# Patient Record
Sex: FEMALE | Race: WHITE | NOT HISPANIC OR LATINO | Employment: FULL TIME | URBAN - NONMETROPOLITAN AREA
[De-identification: names, ages, dates, MRNs, and addresses within clinical notes are randomized per-mention and may not be internally consistent; named-entity substitution may affect disease eponyms.]

---

## 2017-01-03 ENCOUNTER — OFFICE VISIT (OUTPATIENT)
Dept: FAMILY MEDICINE CLINIC | Facility: CLINIC | Age: 52
End: 2017-01-03

## 2017-01-03 VITALS
WEIGHT: 149.9 LBS | BODY MASS INDEX: 25.59 KG/M2 | OXYGEN SATURATION: 98 % | HEIGHT: 64 IN | DIASTOLIC BLOOD PRESSURE: 64 MMHG | SYSTOLIC BLOOD PRESSURE: 148 MMHG | HEART RATE: 92 BPM

## 2017-01-03 DIAGNOSIS — Z11.59 NEED FOR HEPATITIS C SCREENING TEST: ICD-10-CM

## 2017-01-03 DIAGNOSIS — I10 ESSENTIAL HYPERTENSION: ICD-10-CM

## 2017-01-03 DIAGNOSIS — Z12.11 SCREENING FOR COLON CANCER: Primary | ICD-10-CM

## 2017-01-03 DIAGNOSIS — Z12.39 SCREENING FOR BREAST CANCER: ICD-10-CM

## 2017-01-03 PROCEDURE — 99213 OFFICE O/P EST LOW 20 MIN: CPT | Performed by: FAMILY MEDICINE

## 2017-01-03 RX ORDER — LOSARTAN POTASSIUM 50 MG/1
50 TABLET ORAL DAILY
Qty: 30 TABLET | Refills: 6 | Status: SHIPPED | OUTPATIENT
Start: 2017-01-03 | End: 2017-03-06 | Stop reason: SDUPTHER

## 2017-01-03 NOTE — MR AVS SNAPSHOT
Maria Herrera   1/3/2017 3:30 PM   Office Visit    Dept Phone:  [phone]   Encounter #:  [id number]    Provider:  Savage Tam MD   Department:  [facility]                Your Full Care Plan              Today's Medication Changes          These changes are accurate as of: 1/3/17  5:34 PM.  If you have any questions, ask your nurse or doctor.               Medication(s)that have changed:     losartan 50 MG tablet   Commonly known as:  COZAAR   Take 1 tablet by mouth Daily.   What changed:  See the new instructions.   Changed by:  Savage Tam MD         Stop taking medication(s)listed here:     ciprofloxacin-ciproflox HCl  MG 24 hr tablet   Commonly known as:  CIPRO XR   Stopped by:  Savage Tam MD                Where to Get Your Medications      These medications were sent to [facility]/pharmacy #[id number] - [place], [place] - [address] [place] [address] - [phone] [facility] [phone] [address] [address]     Phone:  [phone]     losartan 50 MG tablet                  Your Updated Medication List          This list is accurate as of: 1/3/17  5:34 PM.  Always use your most recent med list.                losartan 50 MG tablet   Commonly known as:  COZAAR   Take 1 tablet by mouth Daily.               We Performed the Following     Ambulatory Referral to Gastroenterology       You Were Diagnosed With        Codes Comments    Screening for colon cancer    -  Primary ICD-10-CM: Z12.11  ICD-9-CM: V76.51     Screening for breast cancer     ICD-10-CM: Z12.39  ICD-9-CM: V76.10     Essential hypertension     ICD-10-CM: I10  ICD-9-CM: 401.9     Need for hepatitis C screening test     ICD-10-CM: Z11.59  ICD-9-CM: V73.89       Instructions     None    Patient Instructions History      Upcoming Appointments     Visit Type Date Time Department    OFFICE VISIT 1/3/2017  3:30 PM [facility] RESIDENT [facility] Signup     Our records indicate that you  "have declined [facility] signup. If you would like to sign up for [facility], please email [email or web address] or call [phone] to obtain an activation code.             Other Info from Your Visit           Allergies     Sulfa Antibiotics        Reason for Visit     Hypertension           Vital Signs     Blood Pressure Pulse Height Weight Oxygen Saturation Body Mass Index    148/64 (BP Location: Left arm, Patient Position: Sitting, Cuff Size: Adult) 92 64\" (162.6 cm) 149 lb 14.4 oz (68 kg) 98% 25.73 kg/m2    Smoking Status                   Never Smoker           Problems and Diagnoses Noted     High blood pressure    Screen for colon cancer    -  Primary    Breast cancer screening        Need for hepatitis C screening test            "

## 2017-01-03 NOTE — PROGRESS NOTES
Subjective   Maria Herrera is a 51 y.o. female. Presented today for her medication refill. She has HTN and has been doing well.     Hypertension   This is a chronic problem. The current episode started more than 1 year ago. The problem has been waxing and waning since onset. The problem is uncontrolled. Pertinent negatives include no anxiety, blurred vision, chest pain, headaches, malaise/fatigue, neck pain, orthopnea, palpitations, peripheral edema, PND, shortness of breath or sweats. There are no associated agents to hypertension. There are no known risk factors for coronary artery disease. Past treatments include angiotensin blockers. The current treatment provides moderate improvement. There is no history of angina, kidney disease, CAD/MI, CVA, heart failure, left ventricular hypertrophy, PVD or retinopathy.        The following portions of the patient's history were reviewed and updated as appropriate:   She  has a past medical history of Acute urinary tract infection; Encounter for screening for malignant neoplasm of colon; Encounter for screening mammogram for malignant neoplasm of breast; and Essential hypertension.  She  does not have any pertinent problems on file.  She  has a past surgical history that includes Pap Smear (05/16/2015).  Her family history includes Alzheimer's disease in her mother; Coronary artery disease in her brother, father, maternal grandmother, and paternal grandmother; Hyperlipidemia in her brother and sister; Hypertension in her brother, father, maternal grandmother, mother, paternal grandmother, and sister; Stroke in her mother.  She  reports that she has never smoked. She does not have any smokeless tobacco history on file. Her alcohol and drug histories are not on file.  Current Outpatient Prescriptions   Medication Sig Dispense Refill   • losartan (COZAAR) 50 MG tablet Take 1 tablet by mouth Daily. 30 tablet 6     No current facility-administered medications for this visit.       Current Outpatient Prescriptions on File Prior to Visit   Medication Sig   • [DISCONTINUED] losartan (COZAAR) 50 MG tablet TAKE 1 TABLET BY MOUTH EVERY DAY   • [DISCONTINUED] ciprofloxacin-ciproflox HCl XR (CIPRO XR) 500 MG 24 hr tablet Take 500 mg by mouth Daily.     No current facility-administered medications on file prior to visit.      She is allergic to sulfa antibiotics..    Review of Systems   Constitutional: Negative.  Negative for fatigue, fever and malaise/fatigue.   HENT: Negative.  Negative for ear pain and sore throat.    Eyes: Negative.  Negative for blurred vision, pain and visual disturbance.   Respiratory: Negative.  Negative for cough and shortness of breath.    Cardiovascular: Negative.  Negative for chest pain, palpitations, orthopnea and PND.   Gastrointestinal: Negative for abdominal pain and nausea.   Endocrine: Negative.    Genitourinary: Negative for dysuria.   Musculoskeletal: Negative for arthralgias and neck pain.   Skin: Negative for rash.   Allergic/Immunologic: Negative.    Neurological: Negative for dizziness, weakness and headaches.   Psychiatric/Behavioral: Negative for sleep disturbance.       Objective    Vitals:    01/03/17 1543   BP: 148/64   Pulse: 92   SpO2: 98%       Physical Exam   Constitutional: She is oriented to person, place, and time. She appears well-developed.   HENT:   Head: Normocephalic.   Right Ear: External ear normal.   Left Ear: External ear normal.   Nose: Nose normal.   Mouth/Throat: Oropharynx is clear and moist.   Eyes: Conjunctivae and EOM are normal. Pupils are equal, round, and reactive to light. Right eye exhibits no discharge. Left eye exhibits no discharge.   Neck: Normal range of motion. No JVD present. No tracheal deviation present. No thyromegaly present.   Cardiovascular: Normal rate, regular rhythm and intact distal pulses.  Exam reveals no gallop and no friction rub.    No murmur heard.  Pulmonary/Chest: Effort normal and breath sounds  normal. No stridor. No respiratory distress. She has no wheezes. She has no rales. She exhibits no tenderness.   Abdominal: Soft. Bowel sounds are normal. She exhibits no distension and no mass. There is no tenderness. No hernia.   Musculoskeletal: Normal range of motion. She exhibits no edema or tenderness.   Neurological: She is alert and oriented to person, place, and time. She has normal reflexes. She displays normal reflexes. No cranial nerve deficit. She exhibits normal muscle tone. Coordination normal.   Psychiatric: She has a normal mood and affect. Her behavior is normal. Judgment and thought content normal.       Assessment/Plan   Maria was seen today for hypertension.    Diagnoses and all orders for this visit:    Screening for colon cancer  -     Ambulatory Referral to Gastroenterology    Screening for breast cancer  -     Mammo Diagnostic Bilateral With CAD; Future    Essential hypertension    Need for hepatitis C screening test  -     Hepatitis Panel, Acute; Future    Other orders  -     losartan (COZAAR) 50 MG tablet; Take 1 tablet by mouth Daily.        Pt had her labs done by her work to check her cholesterol.   She has no other complain. Discuss with pt regarding elevated BP, pt she states that she has been feeling sick. Will continue to check her BP at home. If continue to run will change medication accordingly.   Will order mammogram and colonoscopy. Pt had pap smear in April 2015.           This document has been electronically signed by Savage Tam MD on January 3, 2017 4:21 PM

## 2017-01-06 NOTE — PROGRESS NOTES
I have reviewed the notes, assessments, and/or procedures performed by Savage Tam MD , I concur with her/his documentation of Maria Herrera.        This document has been electronically signed by Tali Wooten MD on January 6, 2017 7:58 AM

## 2017-02-07 ENCOUNTER — OFFICE VISIT (OUTPATIENT)
Dept: FAMILY MEDICINE CLINIC | Facility: CLINIC | Age: 52
End: 2017-02-07

## 2017-02-07 VITALS
HEIGHT: 64 IN | DIASTOLIC BLOOD PRESSURE: 76 MMHG | WEIGHT: 146.6 LBS | HEART RATE: 111 BPM | BODY MASS INDEX: 25.03 KG/M2 | SYSTOLIC BLOOD PRESSURE: 132 MMHG | OXYGEN SATURATION: 98 %

## 2017-02-07 DIAGNOSIS — D17.79 LIPOMA OF OTHER SPECIFIED SITES: Primary | ICD-10-CM

## 2017-02-07 PROCEDURE — 99212 OFFICE O/P EST SF 10 MIN: CPT | Performed by: FAMILY MEDICINE

## 2017-02-07 NOTE — PROGRESS NOTES
Subjective   Maria Herrera is a 51 y.o. female.     Presented today for a mass on back of her right shoulder, it has been there for past 5 months, she wanted to get it checked.   Pt has no other complain today. She is doing well on current medications.     History of Present Illness     The following portions of the patient's history were reviewed and updated as appropriate:   She  has a past medical history of Acute urinary tract infection; Encounter for screening for malignant neoplasm of colon; Encounter for screening mammogram for malignant neoplasm of breast; and Essential hypertension.  She  does not have any pertinent problems on file.  She  has a past surgical history that includes Pap Smear (05/16/2015).  Her family history includes Alzheimer's disease in her mother; Coronary artery disease in her brother, father, maternal grandmother, and paternal grandmother; Hyperlipidemia in her brother and sister; Hypertension in her brother, father, maternal grandmother, mother, paternal grandmother, and sister; Stroke in her mother.  She  reports that she has never smoked. She does not have any smokeless tobacco history on file. Her alcohol and drug histories are not on file.  Current Outpatient Prescriptions   Medication Sig Dispense Refill   • losartan (COZAAR) 50 MG tablet Take 1 tablet by mouth Daily. 30 tablet 6     No current facility-administered medications for this visit.      Current Outpatient Prescriptions on File Prior to Visit   Medication Sig   • losartan (COZAAR) 50 MG tablet Take 1 tablet by mouth Daily.     No current facility-administered medications on file prior to visit.      She is allergic to sulfa antibiotics..    Review of Systems   Constitutional: Negative for activity change, appetite change, fatigue and fever.   HENT: Negative for ear pain and sore throat.    Eyes: Negative for pain and visual disturbance.   Respiratory: Negative for cough and shortness of breath.    Cardiovascular:  Negative for chest pain and palpitations.   Gastrointestinal: Negative for abdominal pain and nausea.   Endocrine: Negative for cold intolerance and heat intolerance.   Genitourinary: Negative for difficulty urinating and dysuria.   Musculoskeletal: Negative for arthralgias and gait problem.   Skin: Negative for color change and rash.   Neurological: Negative for dizziness, weakness and headaches.   Hematological: Negative for adenopathy. Does not bruise/bleed easily.   Psychiatric/Behavioral: Negative for agitation, confusion and sleep disturbance.       Objective    Vitals:    02/07/17 1331   BP: 132/76   Pulse: 111   SpO2: 98%       Physical Exam   Constitutional: She is oriented to person, place, and time. She appears well-developed.   HENT:   Head: Normocephalic.   Right Ear: External ear normal.   Left Ear: External ear normal.   Nose: Nose normal.   Eyes: Conjunctivae and EOM are normal. Pupils are equal, round, and reactive to light. Right eye exhibits no discharge. Left eye exhibits no discharge.   Neck: Normal range of motion.   Cardiovascular: Normal rate, regular rhythm and intact distal pulses.  Exam reveals no gallop and no friction rub.    No murmur heard.  Pulmonary/Chest: Effort normal and breath sounds normal. No respiratory distress. She has no wheezes. She has no rales. She exhibits no tenderness.   Abdominal: Soft. Bowel sounds are normal. She exhibits no distension and no mass. There is no tenderness. No hernia.   Musculoskeletal: Normal range of motion. She exhibits no edema or tenderness.   Neurological: She is alert and oriented to person, place, and time. No cranial nerve deficit. Coordination normal.   Skin:        1x1cm in size node.   Palpable, non-fixed, soft to touch.    Psychiatric: She has a normal mood and affect. Her behavior is normal. Judgment and thought content normal.     Preventive:   She is schedule for colonoscopy in March.   She is up to date on immunization.   Pt is  getting her hepatitis C screening done.     Assessment/Plan   Diagnoses and all orders for this visit:    Lipoma of other specified sites    Lipma is 1x1 cm in size. It is not bothering her, she just wanted to get it checked. Discussed with pt if increase in size will need further follow up. Continue to monitor for any sign of symptoms.           This document has been electronically signed by Savage Tam MD on February 7, 2017 1:53 PM

## 2017-02-07 NOTE — PROGRESS NOTES
I have reviewed the notes, assessments, and/or procedures performed. I concur with her/his documentation of Maria Herrera.     Donny Keane, DO

## 2017-03-06 RX ORDER — LOSARTAN POTASSIUM 50 MG/1
TABLET ORAL
Qty: 30 TABLET | Refills: 3 | Status: SHIPPED | OUTPATIENT
Start: 2017-03-06